# Patient Record
Sex: FEMALE | Race: BLACK OR AFRICAN AMERICAN | URBAN - METROPOLITAN AREA
[De-identification: names, ages, dates, MRNs, and addresses within clinical notes are randomized per-mention and may not be internally consistent; named-entity substitution may affect disease eponyms.]

---

## 2020-09-11 PROBLEM — A49.1 STREPTOCOCCUS AGALACTIAE INFECTION: Status: ACTIVE | Noted: 2020-09-11

## 2020-09-11 PROBLEM — N76.0 VAGINITIS: Status: ACTIVE | Noted: 2020-09-11

## 2020-09-11 PROBLEM — R03.0 ELEVATED BLOOD PRESSURE READING: Status: ACTIVE | Noted: 2020-09-11

## 2020-09-11 PROBLEM — E55.9 VITAMIN D DEFICIENCY: Status: ACTIVE | Noted: 2020-09-11

## 2020-09-11 PROBLEM — B37.31 CANDIDIASIS OF VAGINA: Status: ACTIVE | Noted: 2020-09-11

## 2020-09-11 PROBLEM — N92.0 MENORRHAGIA: Status: ACTIVE | Noted: 2020-09-11

## 2020-09-14 ENCOUNTER — OFFICE VISIT (OUTPATIENT)
Dept: OBGYN CLINIC | Age: 35
End: 2020-09-14
Payer: COMMERCIAL

## 2020-09-14 VITALS — DIASTOLIC BLOOD PRESSURE: 88 MMHG | WEIGHT: 135 LBS | SYSTOLIC BLOOD PRESSURE: 129 MMHG

## 2020-09-14 DIAGNOSIS — Z01.419 ENCOUNTER FOR GYNECOLOGICAL EXAMINATION WITHOUT ABNORMAL FINDING: ICD-10-CM

## 2020-09-14 DIAGNOSIS — Z00.00 ANNUAL PHYSICAL EXAM: Primary | ICD-10-CM

## 2020-09-14 DIAGNOSIS — Z11.3 SCREENING FOR VENEREAL DISEASE: ICD-10-CM

## 2020-09-14 PROCEDURE — 99395 PREV VISIT EST AGE 18-39: CPT | Performed by: OBSTETRICS & GYNECOLOGY

## 2020-09-14 NOTE — PROGRESS NOTES
[facility] Vig[facility]   [facility]  patient is a 28 y.o. black female  2 para  last menstrual period end of 2020, history of vacuum-assisted vaginal delivery 2015, history of Trichomonas , history of oral contraceptive use ×2 years without complication, Depo-Provera ×1 year without complications, Nexplanon ×3 years without complications, medical history notable for headaches, and hypertension, currently on losartan, who presents for annual gynecologic exam    Patient is currently without complaints. She denies vaginal discharge itching odor pelvic pain or vaginal bleeding. She is sexually active with the same partner since . Last sexual encounter was last weekend, and she denies pain or bleeding with intercourse. Last Pap smear 2019negative for squamous intraepithelial lesion; STD screen was negative. Patient is a nonsmoker  Patient works for [employer] for [employer], and [employer]. patient performs monthly self breast examination  Patient does not currently consume vitamin D her calciumshe was encouraged to do so  Patient exercises weekly      Past Medical History:   Diagnosis Date    Headache      No past surgical history on file. Social History     Socioeconomic History    Marital status: SINGLE     Spouse name: Not on file    Number of children: Not on file    Years of education: Not on file    Highest education level: Not on file   Tobacco Use    Smoking status: Former Smoker    Smokeless tobacco: Never Used   Substance and Sexual Activity    Alcohol use: Never     Frequency: Never    Drug use: Never    Sexual activity: Yes     Birth control/protection: Implant      Family History   Problem Relation Age of Onset    Diabetes Mother     Thyroid Disease Mother       No current outpatient medications on file prior to visit. No current facility-administered medications on file prior to visit. Allergies as of 09/14/2020    (No Known Allergies)     Constitutional  Chaperone: accepted and present  General Appearance: healthy-appearing, well-nourished, well-developed (petite black female)  Level of Distress: NAD  Ambulation: ambulating normally    Psychiatric  Insight: good judgement  Mental Status: active and alert, normal mood, normal affect  Orientation: to time, to place, to person  Memory: recent memory normal, remote memory normal    Head  Head: normocephalic, atraumatic    Neck  Neck: supple, trachea midline, no masses, FROM  Lymph Nodes: no cervical LAD, no supraclavicular LAD, no axillary LAD, no inguinal LAD  Thyroid: no enlargement, non-tender, no nodules    Lungs  Respiratory effort: no dyspnea  Auscultation: breath sounds normal, good air movement, CTA except as noted, no wheezing, no rales/crackles, no rhonchi    Cardiovascular  Heart Auscultation: RRR, normal S1, normal S2, no murmurs, no rubs, no gallops  Pulses including femoral / pedal: normal throughout    Breast  Breast: normal appearance, no masses, no abnormal secretions    Abdomen  Bowel Sounds: normal  Inspection and Palpation: soft, non-distended, no tenderness, no guarding, no rebound tenderness, no masses, no CVA tenderness  Liver: non-tender, no hepatomegaly  Spleen: non-tender, no splenomegaly  Hernia: none palpable    Female   External genitalia: normal, no lesions, no rash  Vagina: moist mucosa (scant discharge)  Cervix: no discharge, no cervical motion tenderness, no inflammation, sample taken for Pap smear  Uterus: midline, smooth, normal size, non-tender  Adnexae: size WNL, no adnexal mass, no adnexal tenderness  Bladder and Urethra: normal bladder and urethra (except where noted)    Musculoskeletal:  Motor Strength and Tone: normal motor strength, normal tone  Joints, Bones, and Muscles: normal movement of all extremities, no bony abnormalities, no contractures, no malalignment, no tenderness  Extremities: no cyanosis, no edema, no varicosities, no palpable cord    Neurologic  Gait and Station: normal gait, normal station  Sensation: grossly intact  Reflexes: DTRs 2+ bilaterally throughout    Skin  Inspection and palpation: Multiple tattoos noted; no rash, no lesions, no ulcer, no abnormal nevi, no induration, no nodules, good turgor, no jaundice  Nails: normal    Back    Thoracolumbar Appearance: normal curvature    ICD-10-CM ICD-9-CM    1. Annual physical exam  Z00.00 V70.0 PAP IG, CT-NG, RFX APTIMA HPV ASCUS ([id number], [id number]))   2. Encounter for gynecological examination without abnormal finding  Z01.419 V72.31    3.  Screening for venereal disease  Z11.3 V74.5      Normal exam  Check Pap smear, STD screen  Encourage self breast examination, vitamin D consumption, exercise  Follow-up in 1 year for annual exam

## 2020-09-17 LAB
C TRACH RRNA CVX QL NAA+PROBE: NEGATIVE
CYTOLOGIST CVX/VAG CYTO: NORMAL
CYTOLOGY CVX/VAG DOC CYTO: NORMAL
CYTOLOGY CVX/VAG DOC THIN PREP: NORMAL
DX ICD CODE: NORMAL
LABCORP, 190119: NORMAL
Lab: NORMAL
N GONORRHOEA RRNA CVX QL NAA+PROBE: NEGATIVE
OTHER STN SPEC: NORMAL
STAT OF ADQ CVX/VAG CYTO-IMP: NORMAL

## 2021-06-28 ENCOUNTER — OFFICE VISIT (OUTPATIENT)
Dept: OBGYN CLINIC | Age: 36
End: 2021-06-28
Payer: COMMERCIAL

## 2021-06-28 VITALS
HEART RATE: 90 BPM | HEIGHT: 64 IN | TEMPERATURE: 98.2 F | OXYGEN SATURATION: 99 % | SYSTOLIC BLOOD PRESSURE: 149 MMHG | DIASTOLIC BLOOD PRESSURE: 101 MMHG | BODY MASS INDEX: 21.85 KG/M2 | WEIGHT: 128 LBS | RESPIRATION RATE: 16 BRPM

## 2021-06-28 DIAGNOSIS — Z11.3 SCREENING FOR VENEREAL DISEASE: ICD-10-CM

## 2021-06-28 DIAGNOSIS — N94.89 SUPPRESSION OF MENSES: ICD-10-CM

## 2021-06-28 DIAGNOSIS — Z01.419 ENCOUNTER FOR GYNECOLOGICAL EXAMINATION WITHOUT ABNORMAL FINDING: Primary | ICD-10-CM

## 2021-06-28 DIAGNOSIS — Z12.4 SCREENING FOR MALIGNANT NEOPLASM OF THE CERVIX: ICD-10-CM

## 2021-06-28 LAB
HCG URINE, QL. (POC): NEGATIVE
VALID INTERNAL CONTROL?: NO

## 2021-06-28 PROCEDURE — 99395 PREV VISIT EST AGE 18-39: CPT | Performed by: OBSTETRICS & GYNECOLOGY

## 2021-06-28 PROCEDURE — 11982 REMOVE DRUG IMPLANT DEVICE: CPT | Performed by: OBSTETRICS & GYNECOLOGY

## 2021-06-28 PROCEDURE — 81025 URINE PREGNANCY TEST: CPT | Performed by: OBSTETRICS & GYNECOLOGY

## 2021-06-28 PROCEDURE — 96372 THER/PROPH/DIAG INJ SC/IM: CPT | Performed by: OBSTETRICS & GYNECOLOGY

## 2021-06-28 RX ORDER — MEDROXYPROGESTERONE ACETATE 150 MG/ML
150 INJECTION, SUSPENSION INTRAMUSCULAR ONCE
Qty: 1 SYRINGE | Refills: 4 | Status: SHIPPED | OUTPATIENT
Start: 2021-06-28 | End: 2021-06-28

## 2021-06-28 RX ORDER — MEDROXYPROGESTERONE ACETATE 150 MG/ML
150 INJECTION, SUSPENSION INTRAMUSCULAR ONCE
Status: COMPLETED | OUTPATIENT
Start: 2021-06-28 | End: 2021-06-28

## 2021-06-28 RX ADMIN — MEDROXYPROGESTERONE ACETATE 150 MG: 150 INJECTION, SUSPENSION INTRAMUSCULAR at 14:59

## 2021-06-28 NOTE — PROGRESS NOTES
Dipti Nelson is a  28 y.o. female BLACK/  LMPJune 2021 , history of vacuum-assisted vaginal delivery 2015, history of Trichomonas , history of oral contraceptive use ×2 years without complication, Depo-Provera ×1 year without complications, Nexplanon ×3 years without complications, medical history notable for headaches, and hypertension, currently on losartan, who presents for annual gynecologic exam, and Nexplanon removal.    Patient is currently without complaints. She denies vaginal discharge itching odor pelvic pain or vaginal bleeding. She is sexually active with the same partner since . Last sexual encounter was 1 week ago and she denies pain or bleeding with sex. Last Pap smear 2019 negative for squamous intraepithelial lesion; Patient is a nonsmoker  Patient works for [employer] for [employer], and [employer]. patient performs monthly self breast examination  Patient does not currently consume vitamin D her calciumshe was encouraged to do so  Patient currently exercise regularlyshe was encouraged to do so      Past Medical History:   Diagnosis Date    Headache      History reviewed. No pertinent surgical history. Social History     Socioeconomic History    Marital status: SINGLE     Spouse name: Not on file    Number of children: Not on file    Years of education: Not on file    Highest education level: Not on file   Tobacco Use    Smoking status: Former Smoker    Smokeless tobacco: Never Used   Substance and Sexual Activity    Alcohol use:  Yes    Drug use: Never    Sexual activity: Yes     Birth control/protection: Implant     Social Determinants of Health     Financial Resource Strain:     Difficulty of Paying Living Expenses:    Food Insecurity:     Worried About Running Out of Food in the Last Year:     [address] in the Last Year:    Transportation Needs:     Lack of Transportation (Medical):  Lack of Transportation (Non-Medical):    Physical Activity:     Days of Exercise per Week:     Minutes of Exercise per Session:    Stress:     Feeling of Stress :    Social Connections:     Frequency of Communication with Friends and Family:     Frequency of Social Gatherings with Friends and Family:     Attends V[employer] Services:     Active Member of Clubs or Organizations:     Attends Club or Organization Meetings:     Marital Status:       Family History   Problem Relation Age of Onset    Diabetes Mother     Thyroid Disease Mother       No current outpatient medications on file prior to visit. No current facility-administered medications on file prior to visit.      Allergies as of 06/28/2021    (No Known Allergies)       Constitutional  Chaperone: accepted and present  General Appearance: healthy-appearing, well-nourished, well-developed (petite black female)  Level of Distress: NAD  Ambulation: ambulating normally    Psychiatric  Insight: good judgement  Mental Status: active and alert, normal mood, normal affect  Orientation: to time, to place, to person  Memory: recent memory normal, remote memory normal    Head  Head: normocephalic, atraumatic    Neck  Neck: supple, trachea midline, no masses, FROM  Lymph Nodes: no cervical LAD, no supraclavicular LAD, no axillary LAD, no inguinal LAD  Thyroid: no enlargement, non-tender, no nodules    Lungs  Respiratory effort: no dyspnea  Auscultation: breath sounds normal, good air movement, CTA except as noted, no wheezing, no rales/crackles, no rhonchi    Cardiovascular  Heart Auscultation: RRR, normal S1, normal S2, no murmurs, no rubs, no gallops  Pulses including femoral / pedal: normal throughout    Breast  Breast: normal appearance, no masses, no abnormal secretions    Abdomen  Bowel Sounds: normal  Inspection and Palpation: soft, non-distended, no tenderness, no guarding, no rebound tenderness, no masses, no CVA tenderness  Liver: non-tender, no hepatomegaly  Spleen: non-tender, no splenomegaly  Hernia: none palpable    Female   External genitalia: normal, no lesions, no rash  Vagina: moist mucosa (scant discharge)  Cervix: no discharge, no cervical motion tenderness, no inflammation, sample taken for Pap smear  Uterus: midline, smooth, normal size, non-tender  Adnexae: size WNL, no adnexal mass, no adnexal tenderness  Bladder and Urethra: normal bladder and urethra (except where noted)    Musculoskeletal:  Motor Strength and Tone: normal motor strength, normal tone  Joints, Bones, and Muscles: normal movement of all extremities, no bony abnormalities, no contractures, no malalignment, no tenderness  Extremities: no cyanosis, no edema, no varicosities, no palpable cord    Neurologic  Gait and Station: normal gait, normal station  Sensation: grossly intact  Reflexes: DTRs 2+ bilaterally throughout    Skin  Inspection and palpation: no rash, no lesions, no ulcer, no abnormal nevi, no induration, no nodules, good turgor, no jaundice  Nails: normal    Back  Thoracolumbar Appearance: normal curvature      ICD-10-CM ICD-9-CM    1. Encounter for gynecological examination without abnormal finding  Z01.419 V72.31    2. Screening for malignant neoplasm of the cervix  Z12.4 V76.2 PAP IG, CT-NG-TV, APTIMA HPV AND RFX 22/45,11([id number])   3. Screening for venereal disease  Z11.3 V74.5 PAP IG, CT-NG-TV, APTIMA HPV AND RFX 42/96,46([id number])   4.  Suppression of menses  N94.89 626.8 AMB POC URINE PREGNANCY TEST, VISUAL COLOR COMPARISON      medroxyPROGESTERone (DEPO-PROVERA) 150 mg/mL injection 150 mg      REMOVAL DRUG IMPLANT DEVICE      THER/PROPH/DIAG INJECTION, SUBCUT/IM     Nexplanon removed without difficulty  Depo-Provera given  Check Pap smear, STD screen  Encourage self breast examination, vitamin D consumption, exercise  Follow-up in 12 weeks for Depo-Provera  Follow-up in 1 year for annual exam

## 2021-07-01 LAB
C TRACH RRNA CVX QL NAA+PROBE: NEGATIVE
CYTOLOGIST CVX/VAG CYTO: NORMAL
CYTOLOGY CVX/VAG DOC CYTO: NORMAL
CYTOLOGY CVX/VAG DOC THIN PREP: NORMAL
HPV I/H RISK 4 DNA CVX QL PROBE+SIG AMP: NEGATIVE
Lab: NORMAL
N GONORRHOEA RRNA CVX QL NAA+PROBE: NEGATIVE
OTHER STN SPEC: NORMAL
STAT OF ADQ CVX/VAG CYTO-IMP: NORMAL
T VAGINALIS RRNA SPEC QL NAA+PROBE: NEGATIVE

## 2022-03-18 PROBLEM — B37.31 CANDIDIASIS OF VAGINA: Status: ACTIVE | Noted: 2020-09-11

## 2022-03-19 PROBLEM — R03.0 ELEVATED BLOOD PRESSURE READING: Status: ACTIVE | Noted: 2020-09-11

## 2022-03-19 PROBLEM — N76.0 VAGINITIS: Status: ACTIVE | Noted: 2020-09-11

## 2022-03-19 PROBLEM — A49.1 STREPTOCOCCUS AGALACTIAE INFECTION: Status: ACTIVE | Noted: 2020-09-11

## 2022-03-19 PROBLEM — E55.9 VITAMIN D DEFICIENCY: Status: ACTIVE | Noted: 2020-09-11

## 2022-03-19 PROBLEM — N92.0 MENORRHAGIA: Status: ACTIVE | Noted: 2020-09-11

## 2024-09-13 ENCOUNTER — TELEPHONE (OUTPATIENT)
Facility: CLINIC | Age: 39
End: 2024-09-13

## 2024-09-18 ENCOUNTER — TELEPHONE (OUTPATIENT)
Facility: CLINIC | Age: 39
End: 2024-09-18

## 2025-02-02 PROBLEM — Z11.4 SCREENING FOR HIV (HUMAN IMMUNODEFICIENCY VIRUS): Status: ACTIVE | Noted: 2025-02-02

## 2025-02-02 PROBLEM — Z11.59 NEED FOR HEPATITIS C SCREENING TEST: Status: ACTIVE | Noted: 2025-02-02

## 2025-02-04 ENCOUNTER — OFFICE VISIT (OUTPATIENT)
Facility: CLINIC | Age: 40
End: 2025-02-04
Payer: COMMERCIAL

## 2025-02-04 VITALS
TEMPERATURE: 98 F | HEIGHT: 64 IN | OXYGEN SATURATION: 99 % | BODY MASS INDEX: 21.85 KG/M2 | SYSTOLIC BLOOD PRESSURE: 178 MMHG | DIASTOLIC BLOOD PRESSURE: 100 MMHG | RESPIRATION RATE: 18 BRPM | HEART RATE: 72 BPM | WEIGHT: 128 LBS

## 2025-02-04 DIAGNOSIS — I10 ACCELERATED HYPERTENSION: ICD-10-CM

## 2025-02-04 DIAGNOSIS — Z11.4 SCREENING FOR HIV (HUMAN IMMUNODEFICIENCY VIRUS): ICD-10-CM

## 2025-02-04 DIAGNOSIS — M05.772 RHEUMATOID ARTHRITIS INVOLVING LEFT ANKLE WITH POSITIVE RHEUMATOID FACTOR (HCC): ICD-10-CM

## 2025-02-04 DIAGNOSIS — Z11.59 NEED FOR HEPATITIS C SCREENING TEST: ICD-10-CM

## 2025-02-04 DIAGNOSIS — E55.9 VITAMIN D DEFICIENCY: ICD-10-CM

## 2025-02-04 PROBLEM — M06.9 RHEUMATOID ARTHRITIS (HCC): Status: ACTIVE | Noted: 2022-01-06

## 2025-02-04 PROCEDURE — 3077F SYST BP >= 140 MM HG: CPT | Performed by: INTERNAL MEDICINE

## 2025-02-04 PROCEDURE — 3080F DIAST BP >= 90 MM HG: CPT | Performed by: INTERNAL MEDICINE

## 2025-02-04 PROCEDURE — 99204 OFFICE O/P NEW MOD 45 MIN: CPT | Performed by: INTERNAL MEDICINE

## 2025-02-04 RX ORDER — MEDROXYPROGESTERONE ACETATE 150 MG/ML
50 INJECTION, SUSPENSION INTRAMUSCULAR WEEKLY
COMMUNITY

## 2025-02-04 RX ORDER — AMLODIPINE BESYLATE 5 MG/1
5 TABLET ORAL DAILY
Qty: 90 TABLET | Refills: 0 | Status: SHIPPED | OUTPATIENT
Start: 2025-02-04

## 2025-02-04 RX ORDER — LOSARTAN POTASSIUM AND HYDROCHLOROTHIAZIDE 12.5; 5 MG/1; MG/1
1 TABLET ORAL EVERY MORNING
Qty: 90 TABLET | Refills: 0 | Status: SHIPPED | OUTPATIENT
Start: 2025-02-04

## 2025-02-04 RX ORDER — LEVONORGESTREL 52 MG/1
1 INTRAUTERINE DEVICE INTRAUTERINE ONCE
COMMUNITY
Start: 2024-05-15

## 2025-02-04 SDOH — ECONOMIC STABILITY: FOOD INSECURITY: WITHIN THE PAST 12 MONTHS, THE FOOD YOU BOUGHT JUST DIDN'T LAST AND YOU DIDN'T HAVE MONEY TO GET MORE.: NEVER TRUE

## 2025-02-04 SDOH — ECONOMIC STABILITY: FOOD INSECURITY: WITHIN THE PAST 12 MONTHS, YOU WORRIED THAT YOUR FOOD WOULD RUN OUT BEFORE YOU GOT MONEY TO BUY MORE.: NEVER TRUE

## 2025-02-04 ASSESSMENT — PATIENT HEALTH QUESTIONNAIRE - PHQ9
SUM OF ALL RESPONSES TO PHQ QUESTIONS 1-9: 0
SUM OF ALL RESPONSES TO PHQ QUESTIONS 1-9: 0
SUM OF ALL RESPONSES TO PHQ9 QUESTIONS 1 & 2: 0
SUM OF ALL RESPONSES TO PHQ QUESTIONS 1-9: 0
SUM OF ALL RESPONSES TO PHQ QUESTIONS 1-9: 0
2. FEELING DOWN, DEPRESSED OR HOPELESS: NOT AT ALL
1. LITTLE INTEREST OR PLEASURE IN DOING THINGS: NOT AT ALL

## 2025-02-04 ASSESSMENT — ENCOUNTER SYMPTOMS
GASTROINTESTINAL NEGATIVE: 1
RESPIRATORY NEGATIVE: 1
ALLERGIC/IMMUNOLOGIC NEGATIVE: 1

## 2025-02-04 NOTE — PROGRESS NOTES
Chief Complaint   Patient presents with    New Patient     Concerned about BP and says she has point of weakness?       BP (!) 180/100 (Site: Right Upper Arm, Position: Sitting)   Pulse 61   Temp 98 °F (36.7 °C) (Oral)   Resp 18   Ht 1.626 m (5' 4.02\")   Wt 58.1 kg (128 lb)   SpO2 99%   BMI 21.96 kg/m²     \"Have you been to the ER, urgent care clinic since your last visit?  Hospitalized since your last visit?\"    NO    “Have you seen or consulted any other health care providers outside our system since your last visit?”    Yes Gyno and Rheumatoid

## 2025-02-04 NOTE — PROGRESS NOTES
Claire Maya (: 1985) is a 39 y.o. female, New patient patient, here for evaluation of the following chief complaint(s):  New Patient (Concerned about BP and says she had some dizziness couple of months ago and it was because of high blood pressure but no more episodes now.)         ASSESSMENT/PLAN:  Below is the assessment and plan developed based on review of pertinent history, physical exam, labs, studies, and medications.      1. Accelerated hypertension  -     CBC with Auto Differential; Future  -     Comprehensive Metabolic Panel; Future  -     TSH reflex to T4F; Future  -     EKG 12 Lead; Future  2. Rheumatoid arthritis involving left ankle with positive rheumatoid factor (HCC)  3. Need for hepatitis C screening test  -     Hepatitis C Antibody; Future  4. Screening for HIV (human immunodeficiency virus)  -     HIV 1/2 Ag/Ab, 4TH Generation,W Rflx Confirm; Future  5. Vitamin D deficiency  -     Vitamin D 25 Hydroxy; Future        Ms. Orozco's came to establish with me.  I do not have previous records that she had seen me about 3 years ago.  She says she has rheumatoid arthritis with rheumatoid arthritis factor positive CCP positive in her left ankle for last 4 years follows rheumatologist Dr. Shea at [facility] getting Enbrel injection once a week by herself.    She says that she is having high blood pressure when she follows with rheumatologist and in between couple of months ago  She was told to make appointment with PCP.  She she was taking blood pressure medicine about 3 years ago but she stopped by herself and she told me it was because    Today's blood pressure is elevated in both upper arm.  There is no orthostatic hypotension checked by me    I have reviewed her labs with normal renal function in 2024.  Labs ordered.  Started on losartan/HCTZ 50/12.5 mg once a day.    Started on amlodipine 5 mg once a day.    Side effects discussed including sometimes headache due to amlodipine and

## 2025-02-05 LAB
25(OH)D3+25(OH)D2 SERPL-MCNC: 9.3 NG/ML (ref 30–100)
ALBUMIN SERPL-MCNC: 4.8 G/DL (ref 3.9–4.9)
ALP SERPL-CCNC: 72 IU/L (ref 44–121)
ALT SERPL-CCNC: 12 IU/L (ref 0–32)
AST SERPL-CCNC: 13 IU/L (ref 0–40)
BASOPHILS # BLD AUTO: 0.1 X10E3/UL (ref 0–0.2)
BASOPHILS NFR BLD AUTO: 1 %
BILIRUB SERPL-MCNC: 0.5 MG/DL (ref 0–1.2)
BUN SERPL-MCNC: 13 MG/DL (ref 6–20)
BUN/CREAT SERPL: 14 (ref 9–23)
CALCIUM SERPL-MCNC: 9.5 MG/DL (ref 8.7–10.2)
CHLORIDE SERPL-SCNC: 104 MMOL/L (ref 96–106)
CO2 SERPL-SCNC: 18 MMOL/L (ref 20–29)
CREAT SERPL-MCNC: 0.94 MG/DL (ref 0.57–1)
EGFRCR SERPLBLD CKD-EPI 2021: 79 ML/MIN/1.73
EOSINOPHIL # BLD AUTO: 0.1 X10E3/UL (ref 0–0.4)
EOSINOPHIL NFR BLD AUTO: 1 %
ERYTHROCYTE [DISTWIDTH] IN BLOOD BY AUTOMATED COUNT: 11.6 % (ref 11.7–15.4)
GLOBULIN SER CALC-MCNC: 2.6 G/DL (ref 1.5–4.5)
GLUCOSE SERPL-MCNC: 98 MG/DL (ref 70–99)
HCT VFR BLD AUTO: 40.5 % (ref 34–46.6)
HCV IGG SERPL QL IA: NON REACTIVE
HGB BLD-MCNC: 13.1 G/DL (ref 11.1–15.9)
HIV 1+2 AB+HIV1 P24 AG SERPL QL IA: NON REACTIVE
IMM GRANULOCYTES # BLD AUTO: 0 X10E3/UL (ref 0–0.1)
IMM GRANULOCYTES NFR BLD AUTO: 0 %
LYMPHOCYTES # BLD AUTO: 2.6 X10E3/UL (ref 0.7–3.1)
LYMPHOCYTES NFR BLD AUTO: 47 %
MCH RBC QN AUTO: 31.5 PG (ref 26.6–33)
MCHC RBC AUTO-ENTMCNC: 32.3 G/DL (ref 31.5–35.7)
MCV RBC AUTO: 97 FL (ref 79–97)
MONOCYTES # BLD AUTO: 0.4 X10E3/UL (ref 0.1–0.9)
MONOCYTES NFR BLD AUTO: 7 %
NEUTROPHILS # BLD AUTO: 2.5 X10E3/UL (ref 1.4–7)
NEUTROPHILS NFR BLD AUTO: 44 %
PLATELET # BLD AUTO: 256 X10E3/UL (ref 150–450)
POTASSIUM SERPL-SCNC: 3.9 MMOL/L (ref 3.5–5.2)
PROT SERPL-MCNC: 7.4 G/DL (ref 6–8.5)
RBC # BLD AUTO: 4.16 X10E6/UL (ref 3.77–5.28)
SODIUM SERPL-SCNC: 141 MMOL/L (ref 134–144)
TSH SERPL DL<=0.005 MIU/L-ACNC: 1.44 UIU/ML (ref 0.45–4.5)
WBC # BLD AUTO: 5.6 X10E3/UL (ref 3.4–10.8)

## 2025-02-05 RX ORDER — ERGOCALCIFEROL 1.25 MG/1
50000 CAPSULE, LIQUID FILLED ORAL WEEKLY
Qty: 12 CAPSULE | Refills: 1 | Status: SHIPPED | OUTPATIENT
Start: 2025-02-05

## 2025-02-07 ENCOUNTER — HOSPITAL ENCOUNTER (OUTPATIENT)
Facility: HOSPITAL | Age: 40
Discharge: HOME | End: 2025-02-09
Payer: COMMERCIAL

## 2025-02-07 DIAGNOSIS — I10 ACCELERATED HYPERTENSION: ICD-10-CM

## 2025-02-07 LAB
EKG ATRIAL RATE: 67 BPM
EKG DIAGNOSIS: NORMAL
EKG P AXIS: 64 DEGREES
EKG P-R INTERVAL: 130 MS
EKG Q-T INTERVAL: 380 MS
EKG QRS DURATION: 76 MS
EKG QTC CALCULATION (BAZETT): 401 MS
EKG R AXIS: 76 DEGREES
EKG T AXIS: 35 DEGREES
EKG VENTRICULAR RATE: 67 BPM

## 2025-02-07 PROCEDURE — 93005 ELECTROCARDIOGRAM TRACING: CPT

## 2025-03-04 ENCOUNTER — NURSE ONLY (OUTPATIENT)
Facility: CLINIC | Age: 40
End: 2025-03-04

## 2025-03-04 VITALS — DIASTOLIC BLOOD PRESSURE: 76 MMHG | HEART RATE: 94 BPM | SYSTOLIC BLOOD PRESSURE: 116 MMHG

## 2025-03-04 DIAGNOSIS — I10 ACCELERATED HYPERTENSION: ICD-10-CM

## 2025-03-04 DIAGNOSIS — R03.0 ELEVATED BLOOD PRESSURE READING: Primary | ICD-10-CM

## 2025-03-04 PROBLEM — Z11.4 SCREENING FOR HIV (HUMAN IMMUNODEFICIENCY VIRUS): Status: RESOLVED | Noted: 2025-02-02 | Resolved: 2025-03-04

## 2025-03-04 PROBLEM — Z11.59 NEED FOR HEPATITIS C SCREENING TEST: Status: RESOLVED | Noted: 2025-02-02 | Resolved: 2025-03-04

## 2025-03-04 NOTE — PROGRESS NOTES
Chief Complaint   Patient presents with    Blood Pressure Check         /76 (Site: Right Upper Arm, Position: Sitting)   Pulse 94

## 2025-03-16 PROBLEM — R03.0 ELEVATED BLOOD PRESSURE READING: Status: RESOLVED | Noted: 2020-09-11 | Resolved: 2025-03-16

## 2025-03-20 ENCOUNTER — OFFICE VISIT (OUTPATIENT)
Facility: CLINIC | Age: 40
End: 2025-03-20
Payer: COMMERCIAL

## 2025-03-20 VITALS
WEIGHT: 127 LBS | HEIGHT: 64 IN | RESPIRATION RATE: 16 BRPM | OXYGEN SATURATION: 94 % | TEMPERATURE: 98.9 F | DIASTOLIC BLOOD PRESSURE: 70 MMHG | SYSTOLIC BLOOD PRESSURE: 110 MMHG | HEART RATE: 64 BPM | BODY MASS INDEX: 21.68 KG/M2

## 2025-03-20 DIAGNOSIS — I10 PRIMARY HYPERTENSION: Primary | ICD-10-CM

## 2025-03-20 DIAGNOSIS — M06.9 RHEUMATOID ARTHRITIS, INVOLVING UNSPECIFIED SITE, UNSPECIFIED WHETHER RHEUMATOID FACTOR PRESENT (HCC): ICD-10-CM

## 2025-03-20 DIAGNOSIS — E55.9 VITAMIN D DEFICIENCY: ICD-10-CM

## 2025-03-20 PROCEDURE — 3078F DIAST BP <80 MM HG: CPT | Performed by: INTERNAL MEDICINE

## 2025-03-20 PROCEDURE — 99213 OFFICE O/P EST LOW 20 MIN: CPT | Performed by: INTERNAL MEDICINE

## 2025-03-20 PROCEDURE — 3074F SYST BP LT 130 MM HG: CPT | Performed by: INTERNAL MEDICINE

## 2025-03-20 ASSESSMENT — ENCOUNTER SYMPTOMS
GASTROINTESTINAL NEGATIVE: 1
RESPIRATORY NEGATIVE: 1
ALLERGIC/IMMUNOLOGIC NEGATIVE: 1

## 2025-03-20 NOTE — PROGRESS NOTES
Claire Maya (: 1985) is a 39 y.o. female, Established patient patient, here for evaluation of the following chief complaint(s):  Hypertension         ASSESSMENT/PLAN:  Below is the assessment and plan developed based on review of pertinent history, physical exam, labs, studies, and medications.      1. Primary hypertension  2. Rheumatoid arthritis, involving unspecified site, unspecified whether rheumatoid factor present (HCC)  3. Vitamin D deficiency    Hypertension, well-controlled, continue current dose of amlodipine and losartan/HCTZ.    Vitamin D deficiency with vitamin D level around 9.  She will finish vitamin D once a week and    She should take multivitamin containing calcium    Rheumatoid arthritis under the care of rheumatologist getting Enbrel injection subcu weekly    Follows gynecologist    No depression    Return in about 5 months (around 2025) for follow for chronic condition.         SUBJECTIVE/OBJECTIVE:  HPI    Claire Maya having pleasant personality came for regular follow-up.  Her blood pressure is very well-controlled.  She has no headache or dizziness.  No chest pain or shortness of breath.  No blurry vision she says this blood pressure medicine is working very well and   She is under care of rheumatologist at [facility] having rheumatoid arthritis and CCP positive.  Getting Enbrel injection subcu weekly.  No depression.  Taking vitamin D3 given by me.    No Known Allergies  Current Outpatient Medications   Medication Sig Dispense Refill    vitamin D (ERGOCALCIFEROL) 1.25 MG (29323 UT) CAPS capsule Take 1 capsule by mouth once a week 12 capsule 1    ENBREL SURECLICK 50 MG/ML SOAJ Inject 50 mg into the skin once a week      amLODIPine (NORVASC) 5 MG tablet Take 1 tablet by mouth daily 90 tablet 0    losartan-hydroCHLOROthiazide (HYZAAR) 50-12.5 MG per tablet Take 1 tablet by mouth every morning 90 tablet 0    levonorgestrel (MIRENA, 52 MG,) IUD 52 mg 1 each by

## 2025-03-20 NOTE — PROGRESS NOTES
Chief Complaint   Patient presents with    Hypertension           /68 (BP Site: Left Upper Arm, Patient Position: Sitting)   Pulse 62   Temp 98.9 °F (37.2 °C) (Oral)   Resp 16   Ht 1.626 m (5' 4\")   Wt 57.6 kg (127 lb)   SpO2 94%   BMI 21.80 kg/m²           \"Have you been to the ER, urgent care clinic since your last visit?  Hospitalized since your last visit?\"    NO    “Have you seen or consulted any other health care providers outside our system since your last visit?”    NO

## 2025-05-07 ENCOUNTER — TELEPHONE (OUTPATIENT)
Facility: CLINIC | Age: 40
End: 2025-05-07

## 2025-05-07 RX ORDER — AMLODIPINE BESYLATE 5 MG/1
5 TABLET ORAL DAILY
Qty: 90 TABLET | Refills: 1 | Status: SHIPPED | OUTPATIENT
Start: 2025-05-07

## 2025-05-07 RX ORDER — LOSARTAN POTASSIUM AND HYDROCHLOROTHIAZIDE 12.5; 5 MG/1; MG/1
1 TABLET ORAL EVERY MORNING
Qty: 90 TABLET | Refills: 1 | Status: SHIPPED | OUTPATIENT
Start: 2025-05-07

## 2025-05-08 NOTE — TELEPHONE ENCOUNTER
Patient advised prescription sent to pharmacy  
Patient called requesting a refill for the following medication:        amLODIPine (NORVASC) 5 MG tablet   90 tablet  1 refill      losartan-hydroCHLOROthiazide (HYZAAR) 50-12.5 MG per tablet   90 tablet  1 refill        Please send to [facility] on [address], [place]    
Detail Level: Zone
Detail Level: Detailed

## 2025-08-20 ENCOUNTER — OFFICE VISIT (OUTPATIENT)
Facility: CLINIC | Age: 40
End: 2025-08-20
Payer: COMMERCIAL

## 2025-08-20 VITALS
WEIGHT: 124 LBS | BODY MASS INDEX: 21.17 KG/M2 | HEIGHT: 64 IN | OXYGEN SATURATION: 99 % | TEMPERATURE: 99.4 F | SYSTOLIC BLOOD PRESSURE: 92 MMHG | DIASTOLIC BLOOD PRESSURE: 64 MMHG | HEART RATE: 72 BPM

## 2025-08-20 DIAGNOSIS — I10 PRIMARY HYPERTENSION: ICD-10-CM

## 2025-08-20 DIAGNOSIS — M05.772 RHEUMATOID ARTHRITIS INVOLVING LEFT ANKLE WITH POSITIVE RHEUMATOID FACTOR (HCC): ICD-10-CM

## 2025-08-20 DIAGNOSIS — E55.9 VITAMIN D DEFICIENCY: ICD-10-CM

## 2025-08-20 DIAGNOSIS — Z13.220 SCREENING FOR LIPID DISORDERS: ICD-10-CM

## 2025-08-20 PROCEDURE — 99214 OFFICE O/P EST MOD 30 MIN: CPT | Performed by: INTERNAL MEDICINE

## 2025-08-20 PROCEDURE — 3074F SYST BP LT 130 MM HG: CPT | Performed by: INTERNAL MEDICINE

## 2025-08-20 PROCEDURE — 3078F DIAST BP <80 MM HG: CPT | Performed by: INTERNAL MEDICINE

## 2025-08-20 ASSESSMENT — ENCOUNTER SYMPTOMS
GASTROINTESTINAL NEGATIVE: 1
RESPIRATORY NEGATIVE: 1
ALLERGIC/IMMUNOLOGIC NEGATIVE: 1